# Patient Record
Sex: FEMALE | Race: WHITE | Employment: UNEMPLOYED | URBAN - METROPOLITAN AREA
[De-identification: names, ages, dates, MRNs, and addresses within clinical notes are randomized per-mention and may not be internally consistent; named-entity substitution may affect disease eponyms.]

---

## 2017-09-26 ENCOUNTER — TELEPHONE (OUTPATIENT)
Dept: SURGERY | Age: 37
End: 2017-09-26

## 2017-09-26 NOTE — TELEPHONE ENCOUNTER
Per MBSAQIP requirements:  Letter sent requesting follow up appointment. E-mail sent. Your health is our main concern. It is important for your health to have follow lab work and to see you surgeon at 3 months, 6 months and annually after your weight loss surgery. Your last office visit was on 10/21/2016. Additionally, the Department of Bariatric Surgery at our hospital is a member of the Bariatric National Surgical Quality Improvement Program (ACS NSQIP). As a participant in this program we gather information on the outcomes of our surgical weight loss patients after surgery. Please call the office for a follow up appointment at [phone]. If you have moved out of the area or have changed surgeons please call us and let us know the name of your doctor. Your health and feedback are important to us. We greatly appreciate your response.    Thank you,  [address] [facility] [address] [facility]

## 2017-11-14 ENCOUNTER — OFFICE VISIT (OUTPATIENT)
Dept: SURGERY | Age: 37
End: 2017-11-14

## 2017-11-14 ENCOUNTER — HOSPITAL ENCOUNTER (OUTPATIENT)
Dept: LAB | Age: 37
Discharge: HOME | End: 2017-11-14

## 2017-11-14 VITALS
OXYGEN SATURATION: 98 % | HEIGHT: 64 IN | BODY MASS INDEX: 31.41 KG/M2 | HEART RATE: 91 BPM | SYSTOLIC BLOOD PRESSURE: 113 MMHG | DIASTOLIC BLOOD PRESSURE: 60 MMHG | WEIGHT: 184 LBS

## 2017-11-14 DIAGNOSIS — K90.9 INTESTINAL MALABSORPTION, UNSPECIFIED TYPE: ICD-10-CM

## 2017-11-14 DIAGNOSIS — K90.9 INTESTINAL MALABSORPTION, UNSPECIFIED TYPE: Primary | ICD-10-CM

## 2017-11-14 DIAGNOSIS — Z98.84 S/P BARIATRIC SURGERY: ICD-10-CM

## 2017-11-14 PROCEDURE — 99001 SPECIMEN HANDLING PT-LAB: CPT | Performed by: PHYSICIAN ASSISTANT

## 2017-11-14 NOTE — PATIENT INSTRUCTIONS
Patient Instructions      1. Use cell phone to remind you to take vitamins. 2. Get labs drawn  3. Remember hydration goals - minimum of 64 ounces of liquids per day (dehydration is the number one reason for hospital readmission). 4. Continue to monitor carbohydrate and protein intake you need a minimum of  Grams of protein daily- remember to keep your total carbohydrates to 50 grams or less per day for best results. 5. Continue to work towards exercise goals - 60-90 minutes, 5 times a week minimum of deliberate, aerobic exercise is the ultimate goal with strength training 2 times each week. Refer to [employer] for  information. 6. Remember to take vitamins as directed in your handbook. 7. Attend support group the 2nd Thursday of each month. 8. Use Miralax if you become constipated. 9. Call us at [phone] or email us through [email or web address]" with questions,     concerns or worsening of condition, we have someone on call 24 hours a day. If you are unable to reach our office, you are to go to your Primary Care Physician or the Emergency Department. Supplement Resource Guide    Importance of Protein:   Maintains lean body mass, produces antibodies to fight off infections, heals wounds, minimizes hair loss, helps to give you energy, helps with satiety, and keeping you full between meals. Importance of Calcium:  Needed for healthy bones and teeth, normal blood clotting, and nervous system functioning, higher risk of osteoporosis and bone disease with non-compliance. Importance of Multivitamins: Many functions. Supply you with extra nutrients that you may be missing from food. May lead to iron deficiency anemia, weakness, fatigue, and many other symptoms with non-compliance.     Importance of B Vitamins:  Important for red blood cell formation, metabolism, energy, and helps to maintain a healthy nervous system. Protein Supplement  Find one you like now. Use immediately after surgery. Look for:  35-50g protein each day from your protein supplement once you reach the progression diet. 0-3 g fat per serving  0-3 g sugar per serving    Protein drinks should be split in separate dosages. Recommend: Lifelong  1 year + Calcium Supplement:     Start taking within a month after surgery. Look for: Calcium Citrate Plus D (1500 mg per day)  Recommend: Citracal     .            Avoid chocolate chewable calcium. Can use chewable bariatric or [employer] brand or similar chewable. The body cannot absorb more than 500-600 mg of calcium at a time. Take for Life Multi-vitamin Supplement:      Start immediately after surgery: any complete chewable, such as: [employer]s Complete chewables. Avoid [employer] sours or gummies. They lack iron and other important nutrients and also have added sugar. Continue with chewable vitamin or change to adult complete multivitamin one month after surgery. Menstruating women can take a prenatal vitamin. Make sure has at least 18 mg iron and 886-808 mcg folic acid   Vitamin M42, B Complex Vitamin, and Biotin  Start taking within a month after surgery. Vitamin B12:  1000 mcg of Vitamin B12 three times weekly    Must take sublingually (meaning you take it under your tongue) or in a liquid drop form for easy absorption. B Complex Vitamin: Take a pill or liquid drop form once daily. Biotin: This vitamin can help prevent hair loss.     Recommend 5mg   (5000 mcg) a day  Biotin is Optional

## 2017-11-14 NOTE — PROGRESS NOTES
Subjective:      April L Chip Tena is a 40 y.o. female is now 18 months status post laparoscopic gastric bypass surgery. Doing well overall. She has lost a total of 126 pounds since surgery. Body mass index is 31.58 kg/(m^2). Has lost 70% of EBW. Currently on a solid food diet without difficulty, reports vomiting when eating certain foods and having fatigue with an over feeling of weakness since surgery, she denies abdominal pain and diarrhea. Taking in 80oz water daily. Sources of protein include chicken, peanut butter, fish at times, cheese, beef, protein bars. Patient is a  and eats on the road a lot. She will have fast food and a lot of carbs that are easy to grab on the road. Patient is not exercising deliberately, she walks a fair amount with her job, and she climbs in and out of her truck and containers a lot. Bowel movements are regular, but when she takes her prenatal vitamins she is constipated. The patient is not having any abdominal pain. The patient is not compliant with multivitamins, calcium, Vit D and B12 supplements.      Weight Loss Metrics 11/14/2017 10/21/2016 7/25/2016 7/25/2016 6/30/2016 6/30/2016 5/25/2016   Today's Wt 184 lb 218 lb 248 lb 248 lb 255 lb 255 lb 278 lb   BMI 31.58 kg/m2 37.42 kg/m2 42.55 kg/m2 42.55 kg/m2 43.75 kg/m2 43.75 kg/m2 47.7 kg/m2          Comorbidities:    Hypertension: not applicable  Diabetes: resolved  Obstructive Sleep Apnea: not applicable  Hyperlipidemia: not applicable  Stress Urinary Incontinence: not applicable  Gastroesophageal Reflux: not applicable  Weight related arthropathy:not applicable     Patient Active Problem List   Diagnosis Code    Morbid obesity with BMI of 50.0-59.9, adult ([facility]) E66.01, Z68.43    Diabetes mellitus ([facility] [address] E11.9    GERD (gastroesophageal reflux disease) K21.9    Smoking history Z87.891    Arthritis M19.90    S/P bariatric surgery Z98.84    Intestinal malabsorption K90.9        Past Medical History: Diagnosis Date    Arthritis     Diabetes mellitus ([facility] [address] 2015    GERD (gastroesophageal reflux disease)     Intestinal malabsorption     Morbid obesity with BMI of 50.0-59.9, adult ([facility])     Smoking history     Status post gastric bypass for obesity 4/2016    ellen       Past Surgical History:   Procedure Laterality Date    HX GASTRIC BYPASS  4/2016    ellen    HX GYN      c sections x4    HX GYN      BTL    HX HEENT      tonsils    HX TUBAL LIGATION         Current Outpatient Prescriptions   Medication Sig Dispense Refill    PRENATAL VIT #[id number] PO Take  by mouth.  BIOTIN PO Take  by mouth.  CHOLECALCIFEROL, VITAMIN D3, (VITAMIN D3 PO) Take  by mouth.  cyanocobalamin (VITAMIN B-12) 1,000 mcg sublingual tablet Take 1,000 mcg by mouth daily.  Calcium-Vitamin D3-Vitamin K 500-100-40 mg-unit-mcg chew Take  by mouth.          Allergies   Allergen Reactions    Bactrim [Sulfamethoprim Ds] Hives    Vicodin [Hydrocodone-Acetaminophen] Itching       Review of Systems:  General - Complaints of fatigue, no unexpected fever or chills  Head/Neck - No history or complaints of headache or dizziness  Cardiac - No history or complaints of chest pain, palpitations, or shortness of breath  Pulmonary - No history or complaints of shortness of breath or productive cough  Gastrointestinal - as noted above  Genitourinary - No history or complaints of hematuria/dysuria or renal lithiasis  Musculoskeletal - No history or complaints of joint  muscular weakness  Hematologic - No history of any bleeding episodes  Neurologic - No history or complaints of  migraine headaches or neurologic symptoms    Objective:     Visit Vitals    /60 (BP 1 Location: Right arm, BP Patient Position: Sitting)    Pulse 91    Ht 5' 4\" (1.626 m)    Wt 83.5 kg (184 lb)    SpO2 98%    BMI 31.58 kg/m2       General:  alert, cooperative, no distress, appears stated age   Chest: lungs clear to auscultation, no rhonchi, rales or wheezes, no accessory muscle use   Cor:   Regular rate and rhythm or without murmur or extra heart sounds   Abdomen: soft, bowel sounds active, non-tender, no masses or organomegaly   Incisions:   healing well, no drainage, no erythema, no hernia, no seroma, no swelling, no dehiscence, incision well approximated       Assessment:   History of Morbid obesity, status post laparoscopic gastric bypass surgery. Fatigue, possible anemia    Plan:     1. Increase activity to the goal of 30 minutes daily   2. I discussed the patient using her cell phone to remind her to take her vitamins. 3. Discussed patients weight loss goals and dietary choices in relation to goals. 4. Reminded to measure portions, continue high protein, low carbohydrate diet. Reminded to eat regularly, to eat slowly & not to drink with meals. 5. Continue vitamin supplementation  6. Continue current medications and follow up with PCP for management of regimen. 7. Continue cardio exercise and add resistance exercises. 60-90 minutes of aerobic activity 5 days a week and strength training 2 days each week. 8. Encouraged to attend support group   9. Patient to complete labs before next visit. Lab slip given today. 10. I have discussed this plan with patient and they verbalized understanding  11. Follow up in 6 months or sooner if patient has questions, concerns or worsening of condition, if unable to reach our office, patient should report to the ED. 15. Ms. Ashley Villalobos has a reminder for a \"due or due soon\" health maintenance. I have asked that she contact her primary care provider for a follow-up on this health maintenance.

## 2017-11-17 LAB
25(OH)D3+25(OH)D2 SERPL-MCNC: 36.9 NG/ML (ref 30–100)
ALBUMIN SERPL-MCNC: 4.3 G/DL (ref 3.5–5.5)
ALBUMIN/GLOB SERPL: 1.7 {RATIO} (ref 1.2–2.2)
ALP SERPL-CCNC: 72 IU/L (ref 39–117)
ALT SERPL-CCNC: 22 IU/L (ref 0–32)
AST SERPL-CCNC: 20 IU/L (ref 0–40)
BASOPHILS # BLD AUTO: 0.1 X10E3/UL (ref 0–0.2)
BASOPHILS NFR BLD AUTO: 1 %
BILIRUB SERPL-MCNC: 0.5 MG/DL (ref 0–1.2)
BUN SERPL-MCNC: 10 MG/DL (ref 6–20)
BUN/CREAT SERPL: 14 (ref 9–23)
CALCIUM SERPL-MCNC: 9.3 MG/DL (ref 8.7–10.2)
CHLORIDE SERPL-SCNC: 100 MMOL/L (ref 96–106)
CO2 SERPL-SCNC: 24 MMOL/L (ref 18–29)
CREAT SERPL-MCNC: 0.71 MG/DL (ref 0.57–1)
EOSINOPHIL # BLD AUTO: 0.3 X10E3/UL (ref 0–0.4)
EOSINOPHIL NFR BLD AUTO: 3 %
ERYTHROCYTE [DISTWIDTH] IN BLOOD BY AUTOMATED COUNT: 13.4 % (ref 12.3–15.4)
FERRITIN SERPL-MCNC: 25 NG/ML (ref 15–150)
FOLATE SERPL-MCNC: 15.5 NG/ML
GFR SERPLBLD CREATININE-BSD FMLA CKD-EPI: 110 ML/MIN/1.73
GFR SERPLBLD CREATININE-BSD FMLA CKD-EPI: 127 ML/MIN/1.73
GLOBULIN SER CALC-MCNC: 2.5 G/DL (ref 1.5–4.5)
GLUCOSE SERPL-MCNC: 120 MG/DL (ref 65–99)
HCT VFR BLD AUTO: 42.4 % (ref 34–46.6)
HGB BLD-MCNC: 14.1 G/DL (ref 11.1–15.9)
IMM GRANULOCYTES # BLD: 0 X10E3/UL (ref 0–0.1)
IMM GRANULOCYTES NFR BLD: 0 %
IRON SERPL-MCNC: 120 UG/DL (ref 27–159)
LYMPHOCYTES # BLD AUTO: 3.4 X10E3/UL (ref 0.7–3.1)
LYMPHOCYTES NFR BLD AUTO: 34 %
MCH RBC QN AUTO: 29.9 PG (ref 26.6–33)
MCHC RBC AUTO-ENTMCNC: 33.3 G/DL (ref 31.5–35.7)
MCV RBC AUTO: 90 FL (ref 79–97)
MONOCYTES # BLD AUTO: 0.5 X10E3/UL (ref 0.1–0.9)
MONOCYTES NFR BLD AUTO: 5 %
NEUTROPHILS # BLD AUTO: 5.7 X10E3/UL (ref 1.4–7)
NEUTROPHILS NFR BLD AUTO: 57 %
PLATELET # BLD AUTO: 270 X10E3/UL (ref 150–379)
POTASSIUM SERPL-SCNC: 4.1 MMOL/L (ref 3.5–5.2)
PROT SERPL-MCNC: 6.8 G/DL (ref 6–8.5)
RBC # BLD AUTO: 4.71 X10E6/UL (ref 3.77–5.28)
SODIUM SERPL-SCNC: 140 MMOL/L (ref 134–144)
VIT B1 BLD-SCNC: 132.8 NMOL/L (ref 66.5–200)
VIT B12 SERPL-MCNC: >2000 PG/ML (ref 211–946)
WBC # BLD AUTO: 9.9 X10E3/UL (ref 3.4–10.8)

## 2018-01-02 PROBLEM — N20.1 URETERAL CALCULUS, RIGHT: Status: ACTIVE | Noted: 2018-01-02

## 2018-01-03 PROBLEM — K58.9 IBS (IRRITABLE BOWEL SYNDROME): Status: ACTIVE | Noted: 2017-03-22

## 2018-01-03 PROBLEM — Z01.419 WOMEN'S ANNUAL ROUTINE GYNECOLOGICAL EXAMINATION: Status: ACTIVE | Noted: 2017-12-11

## 2018-01-03 PROBLEM — K76.0 FATTY LIVER: Status: ACTIVE | Noted: 2017-03-22

## 2018-01-03 PROBLEM — Z72.0 TOBACCO ABUSE: Status: ACTIVE | Noted: 2017-03-22

## 2018-09-05 ENCOUNTER — OFFICE VISIT (OUTPATIENT)
Dept: SURGERY | Age: 38
End: 2018-09-05

## 2018-09-05 ENCOUNTER — HOSPITAL ENCOUNTER (OUTPATIENT)
Dept: LAB | Age: 38
Discharge: HOME | End: 2018-09-05

## 2018-09-05 VITALS
HEART RATE: 71 BPM | BODY MASS INDEX: 33.12 KG/M2 | RESPIRATION RATE: 16 BRPM | DIASTOLIC BLOOD PRESSURE: 72 MMHG | HEIGHT: 64 IN | TEMPERATURE: 98 F | SYSTOLIC BLOOD PRESSURE: 136 MMHG | OXYGEN SATURATION: 100 % | WEIGHT: 194 LBS

## 2018-09-05 DIAGNOSIS — K90.9 INTESTINAL MALABSORPTION, UNSPECIFIED TYPE: ICD-10-CM

## 2018-09-05 DIAGNOSIS — K90.9 INTESTINAL MALABSORPTION, UNSPECIFIED TYPE: Primary | ICD-10-CM

## 2018-09-05 DIAGNOSIS — Z98.84 STATUS POST BARIATRIC SURGERY: ICD-10-CM

## 2018-09-05 LAB — XX-LABCORP SPECIMEN COL,LCBCF: NORMAL

## 2018-09-05 PROCEDURE — 99001 SPECIMEN HANDLING PT-LAB: CPT | Performed by: SPECIALIST

## 2018-09-05 RX ORDER — MULTIVIT WITH MINERALS/HERBS
1 TABLET ORAL DAILY
COMMUNITY

## 2018-09-05 RX ORDER — LANOLIN ALCOHOL/MO/W.PET/CERES
1000 CREAM (GRAM) TOPICAL DAILY
COMMUNITY

## 2018-09-05 RX ORDER — BISMUTH SUBSALICYLATE 262 MG
1 TABLET,CHEWABLE ORAL DAILY
COMMUNITY

## 2018-09-05 RX ORDER — GLUCOSAMINE/CHONDR SU A SOD 750-600 MG
TABLET ORAL
COMMUNITY

## 2018-09-05 NOTE — PROGRESS NOTES
Subjective:     Gladys Tena  is a 41 y.o. female who presents for follow-up about 2.5 years following laparoscopic gastric bypass surgery. She has lost a total of 116 pounds since surgery. Body mass index is 33.3 kg/(m^2). . EBWL is (65%). The patient presents today to assess their progress toward their goal of weight loss and to address any issues that may be present. Today the patient and I have reviewed their diet and how appropriate their food choices are. The following issues have been identified : recent episode of passing out and feels dizzy frequently. Feels like she is eating fairly well. Had a visit to the ER at [facility] with normal labs and a CT scan which was normal.    Weight Loss Metrics 9/5/2018 7/18/2018 3/1/2018 1/26/2018 1/3/2018 11/14/2017 10/21/2016   Today's Wt 194 lb 188 lb 184 lb 9.6 oz 180 lb 180 lb 184 lb 218 lb   BMI 33.3 kg/m2 32.27 kg/m2 31.69 kg/m2 30.9 kg/m2 30.9 kg/m2 31.58 kg/m2 37.42 kg/m2     . Surgery related complication: none       She reports as above and denies vomiting and abdominal pain. The patients diet choices have been reviewed today and are as follows:      Breakfast: bagel or muffin then snack at 10 am of fruit      Lunch: hamburger or food to go      Snack: none      Supper :cooks various items and sometimes chips, also cooks protein dishes    Patients pain score:0    Weight Loss Metrics 9/5/2018 7/18/2018 3/1/2018 1/26/2018 1/3/2018 11/14/2017 10/21/2016   Today's Wt 194 lb 188 lb 184 lb 9.6 oz 180 lb 180 lb 184 lb 218 lb   BMI 33.3 kg/m2 32.27 kg/m2 31.69 kg/m2 30.9 kg/m2 30.9 kg/m2 31.58 kg/m2 37.42 kg/m2        The patient's exercise level: moderately active. Changes in her medical history and medications have been reviewed.     Patient Active Problem List   Diagnosis Code    Morbid obesity with BMI of 50.0-59.9, adult ([facility]) E66.01, Z68.43    Type 2 diabetes mellitus ([address] E11.9    GERD (gastroesophageal reflux disease) K21.9    Smoking history I67.165    Arthritis M19.90    Morbid obesity with body mass index of 50.0-59.9 in adult ([facility]) E66.01, Z68.43    Status post bariatric surgery Z98.84    Intestinal malabsorption K90.9    BMI 31.0-31.9,adult Z68.31    Ureteral calculus, right N20.1    Abnormal liver function tests R94.5    Adiposity E66.9    Amenorrhea N91.2    Fatty liver I92.0    Follicular cyst of ovary N83.00    Galactorrhea not associated with childbirth N64.3    Hand paresthesia R20.2    Human papilloma virus (HPV) infection B97.7    Hyperglycemia R73.9    Hypertriglyceridemia E78.1    IBS (irritable bowel syndrome) K58.9    Idiopathic scoliosis M41.20    Pain in pelvis R10.2    Periodontal disease K05.6    Personal history of nicotine dependence Z87.891    History of disease Z87.898    Reduced libido R68.82    Sprain of distal tibiofibular ligament S93.439A    Tobacco abuse Z72.0    Women's annual routine gynecological examination Z01.419     Past Medical History:   Diagnosis Date    Arthritis     Diabetes mellitus ([facility] [address] 2015    GERD (gastroesophageal reflux disease)     Intestinal malabsorption     Kidney stone     Morbid obesity with BMI of 50.0-59.9, adult ([facility] [address]     Smoking history     Status post gastric bypass for obesity 4/2016    terracina    Ureteral calculus, right 1/2/2018    CT  1/1/18 right kidney is slightly enlarged and hypodense as compared to left kidney.  There is right sided hydroureteronephrosis secondary to a stone at the right UVJ measuring 9 mm x 6 mm.  The density of this stone is 832 Hounsfield units.  A stone formally seen at the left UVJ on the old scan is not seen currently and left-sided hydroureteronephrosis seen at that time has also resolved. [facility] n     Past Surgical History:   Procedure Laterality Date    HX GASTRIC BYPASS  4/2016    terracina    HX GYN      c sections x4    HX GYN      BTL    HX HEENT      tonsils    HX TUBAL LIGATION      HX UROLOGICAL  01/01/2018 [facility], Cystoscopy, bilateral retrograde pyelograms, and right double-J ureteral stent placement, Dr. Rocio Concepcion  UROLOGICAL  01/05/2018    [facility], Cystoscopy, holmium laser lithotripsy, basket extraction of distal right ureteral stone fragments, and stent exchange, Dr. Martine Davis     Current Outpatient Prescriptions   Medication Sig Dispense Refill    multivitamin (ONE A DAY) tablet Take 1 Tab by mouth daily.  cyanocobalamin 1,000 mcg tablet Take 1,000 mcg by mouth daily.  Biotin 2,500 mcg cap Take  by mouth.  b complex vitamins (B COMPLEX 1) tablet Take 1 Tab by mouth daily.  calcium carbonate/vitamin D3 (CALCIUM + D PO) Take  by mouth.  potassium citrate (UROCIT-K 10) 10 mEq (1,080 mg) TbER Take 1 Tab by mouth four (4) times daily (with meals).  Indications: Calcium Oxalate Renal Calculi 360 Tab 3        Review of Symptoms:       General - No history or complaints of unexpected fever or chills  Head/Neck - No history or complaints of headache or dizziness  Cardiac - No history or complaints of chest pain, palpitations, or shortness of breath  Pulmonary - No history or complaints of shortness of breath or productive cough  Gastrointestinal - as noted above  Genitourinary - No history or complaints of hematuria/dysuria or renal lithiasis  Musculoskeletal - No history or complaints of joint  muscular weakness  Hematologic - No history of any bleeding episodes  Neurologic - No history or complaints of  migraine headaches or neurologic symptoms                     Objective:     Visit Vitals    /72 (BP 1 Location: Left arm, BP Patient Position: Sitting)    Pulse 71    Temp 98 °F (36.7 °C) (Temporal)    Resp 16    Ht 5' 4\" (1.626 m)    Wt 88 kg (194 lb)    SpO2 100%    BMI 33.3 kg/m2        Physical Exam:    General:  alert, cooperative, no distress, appears stated age   Lungs:   clear to auscultation bilaterally   Heart:  Regular rate and rhythm   Abdomen:   abdomen is soft without significant tenderness, masses, organomegaly or guarding; Incisions: healing well, no hernias       Lab Results   Component Value Date/Time    WBC 9.9 11/14/2017 03:00 PM    HGB 14.1 11/14/2017 03:00 PM    HCT 42.4 11/14/2017 03:00 PM    PLATELET 753 01/71/5930 03:00 PM    MCV 90 11/14/2017 03:00 PM     Lab Results   Component Value Date/Time    Sodium 140 11/14/2017 03:00 PM    Potassium 4.1 11/14/2017 03:00 PM    Chloride 100 11/14/2017 03:00 PM    CO2 24 11/14/2017 03:00 PM    Anion gap 9 04/27/2016 09:00 AM    Glucose 120 (H) 11/14/2017 03:00 PM    BUN 10 11/14/2017 03:00 PM    Creatinine 0.71 11/14/2017 03:00 PM    BUN/Creatinine ratio 14 11/14/2017 03:00 PM    GFR est  11/14/2017 03:00 PM    GFR est non- 11/14/2017 03:00 PM    Calcium 9.5 01/30/2018 12:00 AM    Bilirubin, total 0.5 11/14/2017 03:00 PM    AST (SGOT) 20 11/14/2017 03:00 PM    Alk. phosphatase 72 11/14/2017 03:00 PM    Protein, total 6.8 11/14/2017 03:00 PM    Albumin 4.3 11/14/2017 03:00 PM    Globulin 3.6 04/27/2016 09:00 AM    A-G Ratio 1.7 11/14/2017 03:00 PM    ALT (SGPT) 22 11/14/2017 03:00 PM     Lab Results   Component Value Date/Time    Iron 120 11/14/2017 03:00 PM    Ferritin 25 11/14/2017 03:00 PM     Lab Results   Component Value Date/Time    Folate 15.5 11/14/2017 03:00 PM     Lab Results   Component Value Date/Time    VITAMIN D, 25-HYDROXY 36.9 11/14/2017 03:00 PM           Assessment:     1. History of Morbid obesity, status post  laparoscopic gastric bypass surgery. Needs more protein throughout the day. Plan:     1. Remember to measure portions, continue low carbohydrate diet  2. Continue to concentrate on protein intake meeting daily requirements  3. Remember vitamin supplements. The importance of such was discussed regarding the malabsorptive issues that the surgery creates. 4. Exercise regimen appears to be: fair  5. Try and attend support group if feasible. 6. Follow-up in 3 month(s).   7. Lab ordered  8. Total time spent with the patient 30 minutes.

## 2018-09-05 NOTE — MR AVS SNAPSHOT
[address] [facility] 
 
 
 [facility] [address] 
[phone] Patient: Td Campbell MRN: [id number] [facility]:[birth year] Visit Information Date & Time Provider Department Dept. Phone Encounter #  
 9/5/2018  9:00 AM [facility] Lizzie Aviles [phone] [id number] Follow-up Instructions Return in about 3 months (around 12/5/2018). Follow-up and Disposition History Your Appointments 1/18/2019 11:15 AM  
ESTABLISHED PATIENT with Ernestina Flores MD  
[facility] (Philip Mathur) Appt Note: 6 mo fu/stones [address] [facility] [address] [facility] [address] [place] [address]  
[phone]  
  
   
 [place] [address] Upcoming Health Maintenance Date Due  
 LIPID PANEL Q1 1981 FOOT EXAM Q1 3/8/1991 MICROALBUMIN Q1 3/8/1991 EYE EXAM RETINAL OR DILATED Q1 3/8/1991 Pneumococcal 19-64 Medium Risk (1 of 1 - PPSV23) 3/8/2000 DTaP/Tdap/Td series (1 - Tdap) 3/8/2002 PAP AKA CERVICAL CYTOLOGY 3/8/2002 HEMOGLOBIN A1C Q6M 10/18/2016 Influenza Age 5 to Adult 8/1/2018 Allergies as of 9/5/2018  Review Complete On: 9/5/2018 By: Luis M Jeffries MD  
  
 Severity Noted Reaction Type Reactions Ciprofloxacin Medium 03/22/2017    Hives Nitrofurantoin Medium 07/09/2018    Diarrhea Bactrim [Sulfamethoprim Ds]  11/02/2015    Hives Vicodin [Hydrocodone-acetaminophen]  11/02/2015    Itching, Hives Sulfamethoxazole-trimethoprim Low 03/13/2017    Rash Current Immunizations  Never Reviewed No immunizations on file. Not reviewed this visit You Were Diagnosed With   
  
 Codes Comments Intestinal malabsorption, unspecified type    -  Primary ICD-10-CM: K90.9 ICD-9-CM: 579.9 Status post bariatric surgery     ICD-10-CM: Z98.84 ICD-9-CM: V45.86 Vitals BP Pulse Temp Resp Height(growth percentile) Weight(growth percentile) 136/72 (BP 1 Location: Left arm, BP Patient Position: Sitting) 71 98 °F (36.7 °C) (Temporal) 16 5' 4\" (1.626 m) 194 lb (88 kg) SpO2 BMI OB Status Smoking Status 100% 33.3 kg/m2 Having regular periods Current Every Day Smoker Vitals History BMI and BSA Data Body Mass Index Body Surface Area  
 33.3 kg/m 2 1.99 m 2 Preferred Pharmacy Pharmacy Name Phone [id number], [address] [place] [address] Your Updated Medication List  
  
   
This list is accurate as of 9/5/18  9:55 AM.  Always use your most recent med list.  
  
  
  
  
 B COMPLEX 1 tablet Generic drug:  b complex vitamins Take 1 Tab by mouth daily. Biotin 2,500 mcg Cap Take  by mouth. CALCIUM + D PO Take  by mouth.  
  
 cyanocobalamin 1,000 mcg tablet Take 1,000 mcg by mouth daily. multivitamin tablet Commonly known as:  ONE A DAY Take 1 Tab by mouth daily. potassium citrate 10 mEq (1,080 mg) Charles Parents Commonly known as:  UROCIT-K 10 Take 1 Tab by mouth four (4) times daily (with meals). Indications: Calcium Oxalate Renal Calculi Follow-up Instructions Return in about 3 months (around 12/5/2018). To-Do List   
 09/05/2018 Lab:  TSH 3RD GENERATION   
  
 09/05/2018 Lab:  VITAMIN D, 25 HYDROXY   
  
 11/04/2018 Lab:  CBC WITH AUTOMATED DIFF   
  
 11/04/2018 Lab:  FERRITIN   
  
 11/04/2018 Lab:  IRON   
  
 11/04/2018 Lab:  METABOLIC PANEL, COMPREHENSIVE   
  
 11/04/2018 Lab:  VITAMIN B1, WHOLE BLOOD   
  
 11/04/2018 Lab:  VITAMIN B12 & FOLATE Patient Instructions Body Mass Index: Care Instructions Your Care Instructions Body mass index (BMI) can help you see if your weight is raising your risk for health problems. It uses a formula to compare how much you weigh with how tall you are. · A BMI lower than 18.5 is considered underweight. · A BMI between 18.5 and 24.9 is considered healthy. · A BMI between 25 and 29.9 is considered overweight. A BMI of 30 or higher is considered obese. If your BMI is in the normal range, it means that you have a lower risk for weight-related health problems. If your BMI is in the overweight or obese range, you may be at increased risk for weight-related health problems, such as high blood pressure, heart disease, stroke, arthritis or joint pain, and diabetes. If your BMI is in the underweight range, you may be at increased risk for health problems such as fatigue, lower protection (immunity) against illness, muscle loss, bone loss, hair loss, and hormone problems. BMI is just one measure of your risk for weight-related health problems. You may be at higher risk for health problems if you are not active, you eat an unhealthy diet, or you drink too much alcohol or use tobacco products. Follow-up care is a key part of your treatment and safety. Be sure to make and go to all appointments, and call your doctor if you are having problems. It's also a good idea to know your test results and keep a list of the medicines you take. How can you care for yourself at home? · Practice healthy eating habits. This includes eating plenty of fruits, vegetables, whole grains, lean protein, and low-fat dairy. · If your doctor recommends it, get more exercise. Walking is a good choice. Bit by bit, increase the amount you walk every day. Try for at least 30 minutes on most days of the week. · Do not smoke. Smoking can increase your risk for health problems. If you need help quitting, talk to your doctor about stop-smoking programs and medicines. These can increase your chances of quitting for good. · Limit alcohol to 2 drinks a day for men and 1 drink a day for women. Too much alcohol can cause health problems. If you have a BMI higher than 25 · Your doctor may do other tests to check your risk for weight-related health problems. This may include measuring the distance around your waist. A waist measurement of more than 40 inches in men or 35 inches in women can increase the risk of weight-related health problems. · Talk with your doctor about steps you can take to stay healthy or improve your health. You may need to make lifestyle changes to lose weight and stay healthy, such as changing your diet and getting regular exercise. If you have a BMI lower than 18.5 · Your doctor may do other tests to check your risk for health problems. · Talk with your doctor about steps you can take to stay healthy or improve your health. You may need to make lifestyle changes to gain or maintain weight and stay healthy, such as getting more healthy foods in your diet and doing exercises to build muscle. Where can you learn more? Go to http://dipak-prince.info/. Enter S176 in the search box to learn more about \"Body Mass Index: Care Instructions. \" Current as of: October 9, 2017 Content Version: 11.7 © 7323-7741 [employer]. Care instructions adapted under license by [employer] (which disclaims liability or warranty for this information). If you have questions about a medical condition or this instruction, always ask your healthcare professional. [address] any warranty or liability for your use of this information. Patient Instructions 1. Continue to monitor carbohydrate and protein intake- remember to keep your           total  carbohydrates to 50 grams or less per day for best results. 2. Remember hydration goals - usually 48 to 64 ounces of liquids per day 3. Continue to work towards exercise goals - minimum 3 days per week of 45          minutes to  1 hour at a time. 4. Remember to take vitamins as directed Supplement Resource Guide Importance of Protein:  
Maintains lean body mass, produces antibodies to fight off infections, heals wounds, minimizes hair loss, helps to give you energy, helps with satiety, and keeping you full between meals. Importance of Calcium: 
Needed for healthy bones and teeth, normal blood clotting, and nervous system functioning, higher risk of osteoporosis and bone disease with non-compliance. Importance of Multivitamins: Many functions. Supply you with extra nutrients that you may be missing from food. May lead to iron deficiency anemia, weakness, fatigue, and many other symptoms with non-compliance. Importance of B Vitamins: 
Important for red blood cell formation, metabolism, energy, and helps to maintain a healthy nervous system. Protein Supplement Find one you like now. Use immediately after surgery. Look for: 
35-50g protein each day from your protein supplement once you reach the progression diet. 0-3 g fat per serving 0-3 g sugar per serving Protein drinks should be split in separate dosages. Recommend: Lifelong 1 year + Calcium Supplement:  
 
Start taking within a month after surgery. Look for: Calcium Citrate Plus D (1500 mg per day) Recommend: Citracal 
 
 . Avoid chocolate chewable calcium. Can use chewable bariatric or GNC brand or similar chewable. The body cannot absorb more than 500-600 mg @ a time. Take for Life Multi-vitamin Supplement:   
 
1st Month After Surgery: Any complete chewable, such as: [place]s Complete chewables. Avoid [place] sours or gummies. They lack iron and other important nutrients and also have added sugar. Continue with chewable vitamin or change to adult complete multivitamin one month after surgery. Menstruating women can take a prenatal vitamin. Make sure has at least 18 mg iron and 149-474 mcg folic acid): Vitamin B12, B Complex Vitamin, and Biotin Start taking within a month after surgery. Vitamin B12:  1000 mcg of Vitamin B12 three times weekly Must take sublingually (meaning you take it under your tongue) or in a liquid drop form for easy absorption. B Complex Vitamin: Take a pill or liquid drop form once daily. Biotin: This vitamin can help prevent hair loss. Recommend 5mg  
(5000 mcg) a day Biotin is Optional  
 
 
 
 
 
 Patient Instructions History Introducing [facility]! [facility] introduces [employer] patient portal. Now you can access parts of your medical record, email your doctor's office, and request medication refills online. 1. In your internet browser, go to [email or web address] 2. Click on the First Time User? Click Here link in the Sign In box. You will see the New Member Sign Up page. 3. Enter your [employer] Access Code exactly as it appears below. You will not need to use this code after youve completed the sign-up process. If you do not sign up before the expiration date, you must request a new code. · [employer] Access Code: [email or web address] Expires: 10/16/2018  3:31 PM 
 
4. Enter the last four digits of your Social Security Number (xxxx) and Date of Birth (mm/dd/yyyy) as indicated and click Submit. You will be taken to the next sign-up page. 5. Create a [employer] ID. This will be your [employer] login ID and cannot be changed, so think of one that is secure and easy to remember. 6. Create a [employer] password. You can change your password at any time. 7. Enter your Password Reset Question and Answer. This can be used at a later time if you forget your password. 8. Enter your e-mail address. You will receive e-mail notification when new information is available in [address] 9. Click Sign Up. You can now view and download portions of your medical record. 10. Click the Download Summary menu link to download a portable copy of your medical information. If you have questions, please visit the Frequently Asked Questions section of the [employer] website. Remember, [employer] is NOT to be used for urgent needs. For medical emergencies, dial 911. Now available from your iPhone and Android! Please provide this summary of care documentation to your next provider. Your primary care clinician is listed as Ernestine Amador. If you have any questions after today's visit, please call [phone].

## 2018-09-05 NOTE — PATIENT INSTRUCTIONS
Body Mass Index: Care Instructions  Your Care Instructions    Body mass index (BMI) can help you see if your weight is raising your risk for health problems. It uses a formula to compare how much you weigh with how tall you are. · A BMI lower than 18.5 is considered underweight. · A BMI between 18.5 and 24.9 is considered healthy. · A BMI between 25 and 29.9 is considered overweight. A BMI of 30 or higher is considered obese. If your BMI is in the normal range, it means that you have a lower risk for weight-related health problems. If your BMI is in the overweight or obese range, you may be at increased risk for weight-related health problems, such as high blood pressure, heart disease, stroke, arthritis or joint pain, and diabetes. If your BMI is in the underweight range, you may be at increased risk for health problems such as fatigue, lower protection (immunity) against illness, muscle loss, bone loss, hair loss, and hormone problems. BMI is just one measure of your risk for weight-related health problems. You may be at higher risk for health problems if you are not active, you eat an unhealthy diet, or you drink too much alcohol or use tobacco products. Follow-up care is a key part of your treatment and safety. Be sure to make and go to all appointments, and call your doctor if you are having problems. It's also a good idea to know your test results and keep a list of the medicines you take. How can you care for yourself at home? · Practice healthy eating habits. This includes eating plenty of fruits, vegetables, whole grains, lean protein, and low-fat dairy. · If your doctor recommends it, get more exercise. Walking is a good choice. Bit by bit, increase the amount you walk every day. Try for at least 30 minutes on most days of the week. · Do not smoke. Smoking can increase your risk for health problems. If you need help quitting, talk to your doctor about stop-smoking programs and medicines. These can increase your chances of quitting for good. · Limit alcohol to 2 drinks a day for men and 1 drink a day for women. Too much alcohol can cause health problems. If you have a BMI higher than 25  · Your doctor may do other tests to check your risk for weight-related health problems. This may include measuring the distance around your waist. A waist measurement of more than 40 inches in men or 35 inches in women can increase the risk of weight-related health problems. · Talk with your doctor about steps you can take to stay healthy or improve your health. You may need to make lifestyle changes to lose weight and stay healthy, such as changing your diet and getting regular exercise. If you have a BMI lower than 18.5  · Your doctor may do other tests to check your risk for health problems. · Talk with your doctor about steps you can take to stay healthy or improve your health. You may need to make lifestyle changes to gain or maintain weight and stay healthy, such as getting more healthy foods in your diet and doing exercises to build muscle. Where can you learn more? Go to http://dipak-prince.info/. Enter S176 in the search box to learn more about \"Body Mass Index: Care Instructions. \"  Current as of: October 9, 2017  Content Version: 11.7  © 0208-7313 [employer]. Care instructions adapted under license by [employer] (which disclaims liability or warranty for this information). If you have questions about a medical condition or this instruction, always ask your healthcare professional. [place] [address] any warranty or liability for your use of this information. Patient Instructions      1. Continue to monitor carbohydrate and protein intake- remember to keep your           total  carbohydrates to 50 grams or less per day for best results.   2. Remember hydration goals - usually 48 to 64 ounces of liquids per day  3. Continue to work towards exercise goals - minimum 3 days per week of 45          minutes to  1 hour at a time. 4. Remember to take vitamins as directed        Supplement Resource Guide    Importance of Protein:   Maintains lean body mass, produces antibodies to fight off infections, heals wounds, minimizes hair loss, helps to give you energy, helps with satiety, and keeping you full between meals. Importance of Calcium:  Needed for healthy bones and teeth, normal blood clotting, and nervous system functioning, higher risk of osteoporosis and bone disease with non-compliance. Importance of Multivitamins: Many functions. Supply you with extra nutrients that you may be missing from food. May lead to iron deficiency anemia, weakness, fatigue, and many other symptoms with non-compliance. Importance of B Vitamins:  Important for red blood cell formation, metabolism, energy, and helps to maintain a healthy nervous system. Protein Supplement  Find one you like now. Use immediately after surgery. Look for:  35-50g protein each day from your protein supplement once you reach the progression diet. 0-3 g fat per serving  0-3 g sugar per serving    Protein drinks should be split in separate dosages. Recommend: Lifelong  1 year + Calcium Supplement:     Start taking within a month after surgery. Look for: Calcium Citrate Plus D (1500 mg per day)  Recommend: Citracal     .          Avoid chocolate chewable calcium. Can use chewable bariatric or GNC brand or similar chewable. The body cannot absorb more than 500-600 mg @ a time. Take for Life Multi-vitamin Supplement:      1st Month After Surgery: Any complete chewable, such as: Garys Complete chewables. Avoid Gary sours or gummies. They lack iron and other important nutrients and also have added sugar.     Continue with chewable vitamin or change to adult complete multivitamin one month after surgery. Menstruating women can take a prenatal vitamin. Make sure has at least 18 mg iron and 364-905 mcg folic acid):    Vitamin B12, B Complex Vitamin, and Biotin  Start taking within a month after surgery. Vitamin B12:  1000 mcg of Vitamin B12 three times weekly    Must take sublingually (meaning you take it under your tongue) or in a liquid drop form for easy absorption. B Complex Vitamin: Take a pill or liquid drop form once daily. Biotin: This vitamin can help prevent hair loss.     Recommend 5mg   (5000 mcg) a day  Biotin is Optional

## 2018-09-05 NOTE — PROGRESS NOTES
Patient reported episodes of lightheadness,confusion, syncope, abdominal pain prior to bowel movements. These symptoms are heightned during menses. Recently diagnosed with Mono.

## 2018-09-08 LAB
25(OH)D3+25(OH)D2 SERPL-MCNC: 47.3 NG/ML (ref 30–100)
ALBUMIN SERPL-MCNC: 4.1 G/DL (ref 3.5–5.5)
ALBUMIN/GLOB SERPL: 1.5 {RATIO} (ref 1.2–2.2)
ALP SERPL-CCNC: 72 IU/L (ref 39–117)
ALT SERPL-CCNC: 19 IU/L (ref 0–32)
AST SERPL-CCNC: 21 IU/L (ref 0–40)
BASOPHILS # BLD AUTO: 0 X10E3/UL (ref 0–0.2)
BASOPHILS NFR BLD AUTO: 1 %
BILIRUB SERPL-MCNC: 0.5 MG/DL (ref 0–1.2)
BUN SERPL-MCNC: 11 MG/DL (ref 6–20)
BUN/CREAT SERPL: 14 (ref 9–23)
CALCIUM SERPL-MCNC: 9.5 MG/DL (ref 8.7–10.2)
CHLORIDE SERPL-SCNC: 103 MMOL/L (ref 96–106)
CO2 SERPL-SCNC: 25 MMOL/L (ref 20–29)
CREAT SERPL-MCNC: 0.77 MG/DL (ref 0.57–1)
EOSINOPHIL # BLD AUTO: 0.2 X10E3/UL (ref 0–0.4)
EOSINOPHIL NFR BLD AUTO: 2 %
ERYTHROCYTE [DISTWIDTH] IN BLOOD BY AUTOMATED COUNT: 14.1 % (ref 12.3–15.4)
FERRITIN SERPL-MCNC: 10 NG/ML (ref 15–150)
FOLATE SERPL-MCNC: 16.5 NG/ML
GLOBULIN SER CALC-MCNC: 2.7 G/DL (ref 1.5–4.5)
GLUCOSE SERPL-MCNC: 111 MG/DL (ref 65–99)
HCT VFR BLD AUTO: 42.6 % (ref 34–46.6)
HGB BLD-MCNC: 13.5 G/DL (ref 11.1–15.9)
IMM GRANULOCYTES # BLD: 0 X10E3/UL (ref 0–0.1)
IMM GRANULOCYTES NFR BLD: 0 %
IRON SERPL-MCNC: 59 UG/DL (ref 27–159)
LYMPHOCYTES # BLD AUTO: 3.2 X10E3/UL (ref 0.7–3.1)
LYMPHOCYTES NFR BLD AUTO: 39 %
MCH RBC QN AUTO: 29.2 PG (ref 26.6–33)
MCHC RBC AUTO-ENTMCNC: 31.7 G/DL (ref 31.5–35.7)
MCV RBC AUTO: 92 FL (ref 79–97)
MONOCYTES # BLD AUTO: 0.2 X10E3/UL (ref 0.1–0.9)
MONOCYTES NFR BLD AUTO: 3 %
NEUTROPHILS # BLD AUTO: 4.5 X10E3/UL (ref 1.4–7)
NEUTROPHILS NFR BLD AUTO: 55 %
PLATELET # BLD AUTO: 306 X10E3/UL (ref 150–379)
POTASSIUM SERPL-SCNC: 4.4 MMOL/L (ref 3.5–5.2)
PROT SERPL-MCNC: 6.8 G/DL (ref 6–8.5)
RBC # BLD AUTO: 4.63 X10E6/UL (ref 3.77–5.28)
SODIUM SERPL-SCNC: 138 MMOL/L (ref 134–144)
TSH SERPL DL<=0.005 MIU/L-ACNC: 1.15 UIU/ML (ref 0.45–4.5)
VIT B1 BLD-SCNC: 82.3 NMOL/L (ref 66.5–200)
VIT B12 SERPL-MCNC: >2000 PG/ML (ref 232–1245)
WBC # BLD AUTO: 8.1 X10E3/UL (ref 3.4–10.8)

## 2018-09-24 ENCOUNTER — TELEPHONE (OUTPATIENT)
Dept: SURGERY | Age: 38
End: 2018-09-24

## 2018-09-24 NOTE — TELEPHONE ENCOUNTER
Dr. Ernesto Dobson please advise:    Patient reports ulcers under the folds of abdominal skin. Reports oozing and drainage from on of the areas. Advised patient to contact urgent care or PCP for management of skin ulcers. Patient verbalized understanding.

## 2018-11-04 DIAGNOSIS — K90.9 INTESTINAL MALABSORPTION, UNSPECIFIED TYPE: ICD-10-CM

## 2020-04-03 ENCOUNTER — DOCUMENTATION ONLY (OUTPATIENT)
Dept: SURGERY | Age: 40
End: 2020-04-03

## 2020-04-03 NOTE — PROGRESS NOTES
Per [facility] requirements;  E-mail and letter sent for follow up appointment. [facility] [place]  [facility]  [facility]      Dear Patient,    Your health is our main concern. It is important for your health to have follow-up lab work and to see you surgeon at 2 months, 4 months, 6 months, 9 months and annually after your weight loss surgery. Additionally, the Department of Bariatric Surgery at our hospital is a member of the [employer] [address] Surgical Quality Improvement Program ([facility] NSQIP). As a participant in this program, we gather information on the outcomes of our patients after surgery. Please call the office for a follow up appointment at [phone]. If you have moved out of the area or have changed surgeons please call us and let us know the name of your doctor. Your health and feedback are important to us. We greatly appreciate your response.        Thank you,  [facility] [employer]

## 2020-04-03 NOTE — LETTER
[facility] [place] [facility] 
 
 
Dear Patient, Your health is our main concern. It is important for your health to have follow-up lab work and to see you surgeon at 2 months, 4 months, 6 months, 9 months and annually after your weight loss surgery. Additionally, the Department of Bariatric Surgery at our hospital is a member of the [employer] [address] Surgical Quality Improvement Program ([facility] NSQIP). As a participant in this program, we gather information on the outcomes of our patients after surgery. Please call the office for a follow up appointment at [phone]. If you have moved out of the area or have changed surgeons please call us and let us know the name of your doctor. Your health and feedback are important to us. We greatly appreciate your response. Thank you, [facility] [place] [address] [place] [address],-1